# Patient Record
Sex: MALE | Race: WHITE
[De-identification: names, ages, dates, MRNs, and addresses within clinical notes are randomized per-mention and may not be internally consistent; named-entity substitution may affect disease eponyms.]

---

## 2018-06-19 ENCOUNTER — HOSPITAL ENCOUNTER (OUTPATIENT)
Dept: HOSPITAL 61 - SURGPAT | Age: 63
Discharge: HOME | End: 2018-06-19
Attending: ORTHOPAEDIC SURGERY
Payer: COMMERCIAL

## 2018-06-19 DIAGNOSIS — Z01.818: Primary | ICD-10-CM

## 2018-06-19 LAB
ADD MAN DIFF?: NO
ALBUMIN SERPL-MCNC: 3.9 G/DL (ref 3.4–5)
ANION GAP SERPL CALC-SCNC: 9 MMOL/L (ref 6–14)
BACTERIA,URINE: 0 /HPF
BASO #: 0.1 X10^3/UL (ref 0–0.2)
BASO %: 1 % (ref 0–3)
BILIRUBIN,URINE: NEGATIVE
BLOOD UREA NITROGEN: 13 MG/DL (ref 8–26)
CALCIUM: 9.6 MG/DL (ref 8.5–10.1)
CHLORIDE: 101 MMOL/L (ref 98–107)
CLARITY,URINE: CLEAR
CO2 SERPL-SCNC: 27 MMOL/L (ref 21–32)
COLOR,URINE: YELLOW
CREAT SERPL-MCNC: 0.9 MG/DL (ref 0.7–1.3)
EOS #: 0.3 X10^3/UL (ref 0–0.7)
EOS %: 2 % (ref 0–3)
GFR SERPLBLD BASED ON 1.73 SQ M-ARVRAT: 85 ML/MIN
GLUCOSE SERPL-MCNC: 90 MG/DL (ref 70–99)
GLUCOSE,URINE: NEGATIVE MG/DL
HCG SERPL-ACNC: 12.4 X10^3/UL (ref 4–11)
HEMATOCRIT: 45.6 % (ref 39–53)
HEMOGLOBIN: 15.6 G/DL (ref 13–17.5)
INR: 1 (ref 0.8–1.1)
LYMPH #: 1.8 X10^3/UL (ref 1–4.8)
LYMPH %: 15 % (ref 24–48)
MEAN CORPUSCULAR HEMOGLOBIN: 30 PG (ref 25–35)
MEAN CORPUSCULAR HGB CONC: 34 G/DL (ref 31–37)
MEAN CORPUSCULAR VOLUME: 89 FL (ref 79–100)
MONO #: 1.5 X10^3/UL (ref 0–1.1)
MONO %: 12 % (ref 0–9)
NEUT #: 8.7 X10^3UL (ref 1.8–7.7)
NEUT %: 70 % (ref 31–73)
NITRITE,URINE: NEGATIVE
PARTIAL THROMBOPLASTIN TIME: 33 SEC (ref 24–38)
PH,URINE: 6
PLATELET COUNT: 390 X10^3/UL (ref 140–400)
PLT ESTIMATE: ADEQUATE
POTASSIUM SERPL-SCNC: 3.5 MMOL/L (ref 3.5–5.1)
PROTEIN,URINE: NEGATIVE MG/DL
PROTHROMBIN TIME PATIENT: 13.1 SEC (ref 11.7–14)
RBC,URINE: 0 /HPF (ref 0–2)
RED BLOOD COUNT: 5.15 X10^6/UL (ref 4.3–5.7)
RED CELL DISTRIBUTION WIDTH: 13.4 % (ref 11.5–14.5)
SEDIMENTATION RATE: 44 (ref 0–15)
SODIUM: 137 MMOL/L (ref 136–145)
SPECIFIC GRAVITY,URINE: 1.01
SQUAMOUS EPITHELIAL CELL,UR: (no result) /LPF
UROBILINOGEN,URINE: 0.2 MG/DL
WBC,URINE: (no result) /HPF (ref 0–4)

## 2018-06-19 PROCEDURE — 85025 COMPLETE CBC W/AUTO DIFF WBC: CPT

## 2018-06-19 PROCEDURE — 85730 THROMBOPLASTIN TIME PARTIAL: CPT

## 2018-06-19 PROCEDURE — 87086 URINE CULTURE/COLONY COUNT: CPT

## 2018-06-19 PROCEDURE — 81001 URINALYSIS AUTO W/SCOPE: CPT

## 2018-06-19 PROCEDURE — 93005 ELECTROCARDIOGRAM TRACING: CPT

## 2018-06-19 PROCEDURE — 87641 MR-STAPH DNA AMP PROBE: CPT

## 2018-06-19 PROCEDURE — 80048 BASIC METABOLIC PNL TOTAL CA: CPT

## 2018-06-19 PROCEDURE — 82040 ASSAY OF SERUM ALBUMIN: CPT

## 2018-06-19 PROCEDURE — 36415 COLL VENOUS BLD VENIPUNCTURE: CPT

## 2018-06-19 PROCEDURE — 85610 PROTHROMBIN TIME: CPT

## 2018-06-19 PROCEDURE — 71046 X-RAY EXAM CHEST 2 VIEWS: CPT

## 2018-06-19 PROCEDURE — 85651 RBC SED RATE NONAUTOMATED: CPT

## 2018-06-26 ENCOUNTER — HOSPITAL ENCOUNTER (INPATIENT)
Dept: HOSPITAL 61 - OPSVCIP | Age: 63
LOS: 1 days | Discharge: HOME | DRG: 468 | End: 2018-06-27
Attending: ORTHOPAEDIC SURGERY | Admitting: ORTHOPAEDIC SURGERY
Payer: COMMERCIAL
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
## 2018-06-26 DIAGNOSIS — T84.061A: Primary | ICD-10-CM

## 2018-06-26 DIAGNOSIS — T84.021A: ICD-10-CM
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
## 2018-06-26 LAB
INR: 1 (ref 0.8–1.1)
PARTIAL THROMBOPLASTIN TIME: 31 SEC (ref 24–38)
PROTHROMBIN TIME PATIENT: 13 SEC (ref 11.7–14)

## 2018-06-26 PROCEDURE — 0SRS03Z REPLACEMENT OF LEFT HIP JOINT, FEMORAL SURFACE WITH CERAMIC SYNTHETIC SUBSTITUTE, OPEN APPROACH: ICD-10-PCS

## 2018-06-26 PROCEDURE — 97530 THERAPEUTIC ACTIVITIES: CPT

## 2018-06-26 PROCEDURE — 0SUE09Z SUPPLEMENT LEFT HIP JOINT, ACETABULAR SURFACE WITH LINER, OPEN APPROACH: ICD-10-PCS

## 2018-06-26 PROCEDURE — 86900 BLOOD TYPING SEROLOGIC ABO: CPT

## 2018-06-26 PROCEDURE — 86901 BLOOD TYPING SEROLOGIC RH(D): CPT

## 2018-06-26 PROCEDURE — 97165 OT EVAL LOW COMPLEX 30 MIN: CPT

## 2018-06-26 PROCEDURE — 85018 HEMOGLOBIN: CPT

## 2018-06-26 PROCEDURE — 85730 THROMBOPLASTIN TIME PARTIAL: CPT

## 2018-06-26 PROCEDURE — 97116 GAIT TRAINING THERAPY: CPT

## 2018-06-26 PROCEDURE — 36415 COLL VENOUS BLD VENIPUNCTURE: CPT

## 2018-06-26 PROCEDURE — 86850 RBC ANTIBODY SCREEN: CPT

## 2018-06-26 PROCEDURE — 0SPB09Z REMOVAL OF LINER FROM LEFT HIP JOINT, OPEN APPROACH: ICD-10-PCS

## 2018-06-26 PROCEDURE — 88112 CYTOPATH CELL ENHANCE TECH: CPT

## 2018-06-26 PROCEDURE — 0SPS0JZ REMOVAL OF SYNTHETIC SUBSTITUTE FROM LEFT HIP JOINT, FEMORAL SURFACE, OPEN APPROACH: ICD-10-PCS

## 2018-06-26 PROCEDURE — 85610 PROTHROMBIN TIME: CPT

## 2018-06-26 PROCEDURE — 87075 CULTR BACTERIA EXCEPT BLOOD: CPT

## 2018-06-26 PROCEDURE — 97150 GROUP THERAPEUTIC PROCEDURES: CPT

## 2018-06-26 PROCEDURE — 87071 CULTURE AEROBIC QUANT OTHER: CPT

## 2018-06-26 PROCEDURE — 85014 HEMATOCRIT: CPT

## 2018-06-26 RX ADMIN — MORPHINE SULFATE 1: 10 INJECTION INTRAMUSCULAR; INTRAVENOUS; SUBCUTANEOUS at 13:28

## 2018-06-26 RX ADMIN — SODIUM CHLORIDE, SODIUM LACTATE, POTASSIUM CHLORIDE, AND CALCIUM CHLORIDE 1 MLS/HR: .6; .31; .03; .02 INJECTION, SOLUTION INTRAVENOUS at 12:41

## 2018-06-26 RX ADMIN — CLINDAMYCIN PHOSPHATE 1 MLS/HR: 18 INJECTION, SOLUTION INTRAVENOUS at 13:02

## 2018-06-26 RX ADMIN — APIXABAN 1 MG: 2.5 TABLET, FILM COATED ORAL at 21:47

## 2018-06-26 RX ADMIN — Medication 1 MG: at 18:39

## 2018-06-26 RX ADMIN — TRANEXAMIC ACID 1 MLS/HR: 100 INJECTION, SOLUTION INTRAVENOUS at 13:08

## 2018-06-26 RX ADMIN — DEXTROSE AND SODIUM CHLORIDE 1 MLS/HR: 5; .45 INJECTION, SOLUTION INTRAVENOUS at 20:04

## 2018-06-26 RX ADMIN — CELECOXIB 1 MG: 100 CAPSULE ORAL at 12:46

## 2018-06-26 RX ADMIN — CLINDAMYCIN PHOSPHATE 1 MLS/HR: 18 INJECTION, SOLUTION INTRAVENOUS at 18:40

## 2018-06-26 RX ADMIN — SIMVASTATIN 1 MG: 40 TABLET, FILM COATED ORAL at 21:47

## 2018-06-26 RX ADMIN — DEXTROSE AND SODIUM CHLORIDE 1 MLS/HR: 5; .45 INJECTION, SOLUTION INTRAVENOUS at 16:06

## 2018-06-27 LAB
HEMATOCRIT: 42.2 % (ref 39–53)
HEMOGLOBIN: 14.4 G/DL (ref 13–17.5)
MEAN CORPUSCULAR HGB CONC: 34 G/DL (ref 31–37)

## 2018-06-27 RX ADMIN — CELECOXIB 1 MG: 100 CAPSULE ORAL at 08:25

## 2018-06-27 RX ADMIN — Medication 1 MG: at 08:25

## 2018-06-27 RX ADMIN — MULTIPLE VITAMINS W/ MINERALS TAB 1 TAB: TAB at 08:25

## 2018-06-27 RX ADMIN — APIXABAN 1 MG: 2.5 TABLET, FILM COATED ORAL at 08:25

## 2018-06-27 RX ADMIN — CLINDAMYCIN PHOSPHATE 1 MLS/HR: 18 INJECTION, SOLUTION INTRAVENOUS at 06:30

## 2018-06-27 RX ADMIN — SENNOSIDES AND DOCUSATE SODIUM 1 TAB: 8.6; 5 TABLET ORAL at 08:25

## 2018-06-27 RX ADMIN — LISINOPRIL 1 MG: 20 TABLET ORAL at 06:54

## 2018-06-27 RX ADMIN — CLINDAMYCIN PHOSPHATE 1 MLS/HR: 18 INJECTION, SOLUTION INTRAVENOUS at 00:35

## 2019-06-04 ENCOUNTER — HOSPITAL ENCOUNTER (EMERGENCY)
Dept: HOSPITAL 61 - ER | Age: 64
Discharge: HOME | End: 2019-06-04
Payer: COMMERCIAL

## 2019-06-04 VITALS — HEIGHT: 74 IN | WEIGHT: 200 LBS | BODY MASS INDEX: 25.67 KG/M2

## 2019-06-04 VITALS — SYSTOLIC BLOOD PRESSURE: 133 MMHG | DIASTOLIC BLOOD PRESSURE: 71 MMHG

## 2019-06-04 VITALS — DIASTOLIC BLOOD PRESSURE: 70 MMHG | SYSTOLIC BLOOD PRESSURE: 130 MMHG
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
## 2019-06-04 DIAGNOSIS — Z88.0: ICD-10-CM

## 2019-06-04 DIAGNOSIS — T84.021A: Primary | ICD-10-CM

## 2019-06-04 DIAGNOSIS — E78.00: ICD-10-CM

## 2019-06-04 DIAGNOSIS — Z96.642: ICD-10-CM
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
## 2019-06-04 DIAGNOSIS — I10: ICD-10-CM

## 2019-06-04 PROCEDURE — 27265 TREAT HIP DISLOCATION: CPT

## 2019-06-04 PROCEDURE — 99284 EMERGENCY DEPT VISIT MOD MDM: CPT

## 2019-06-04 PROCEDURE — 96374 THER/PROPH/DIAG INJ IV PUSH: CPT

## 2019-06-04 PROCEDURE — 99285 EMERGENCY DEPT VISIT HI MDM: CPT

## 2019-06-04 PROCEDURE — 73502 X-RAY EXAM HIP UNI 2-3 VIEWS: CPT

## 2019-06-04 NOTE — PHYS DOC
Past Medical History


Past Medical History:  High Cholesterol, Hypertension


Past Surgical History:  Hip Replacement


Alcohol Use:  None





Adult General


Chief Complaint


Chief Complaint:  HIP PAIN





HPI


HPI


Patient is a 63-year-old male who presents to the emergency department for 

evaluation. Patient states he was on a roof, cleaning gutters, when he twisted 

his left leg wrong, and felt his left hip dislocated. He has a history of a 

prior left total hip arthroplasty, as well as a revision to the surgery with 

hardware about 3 years. He did not improve, or suffer any other traumatic 

injury. He denies any numbness or weakness. He received 100 g By EMS. Movement 

and palpation of his left hip to worsen his pain. There are no alleviating 

factors to his symptoms otherwise.





Review of Systems


Review of Systems





Respiratory: Denies cough or shortness of breath []


GI: Denies abdominal pain, nausea, vomiting, bloody stools or diarrhea []


Musculoskeletal: Denies back pain or joint pain, except in the left hip []


Integument: Denies rash or skin lesions []


Neurologic: Denies headache, focal weakness or sensory changes []





Current Medications


Current Medications





Current Medications








 Medications


  (Trade)  Dose


 Ordered  Sig/Jovan  Start Time


 Stop Time Status Last Admin


Dose Admin


 


 Fentanyl Citrate


  (Fentanyl 2ml


 Vial)  50 mcg  1X  ONCE  6/4/19 10:45


 6/4/19 10:46 DC 6/4/19 10:40


50 MCG


 


 Midazolam HCl


  (Versed)  2 mg  1X  ONCE  6/4/19 11:45


 6/4/19 11:48 DC 6/4/19 11:45


2 MG


 


 Propofol


  (Diprivan)  100 mg  1X  ONCE  6/4/19 11:45


 6/4/19 11:48 DC 6/4/19 11:46


100 MG


 


 Sodium Chloride  1,000 ml @ 


 1,000 mls/hr  1X  ONCE  6/4/19 10:45


 6/4/19 11:44 DC 6/4/19 10:40


1,000 MLS/HR











Allergies


Allergies





Allergies








Coded Allergies Type Severity Reaction Last Updated Verified


 


  Penicillins Adverse Reaction Intermediate Palpitations 6/26/18 Yes











Physical Exam


Physical Exam


PHYSICAL EXAM:





CONSTITUTIONAL: Well developed, well nourished


HEAD: normocephalic, atraumatic


EENT: PERRL, EOMI. Conjunctivae normal color, sclerae non-icteric; moist mucous 

membranes.


NECK: Supple, non-tender; no meningismus.


LUNGS: Lungs CTA, breathing even and unlabored. Normal air movement.


HEART: Regular rate and rhythm, no murmur


CHEST: No deformity; non-tender


ABDOMEN: The abdomen is soft, and non-tender, no masses or bruits.


EXTREM: The patient is holding the left hip and knee flexed, and is laying on 

his right side. There is tenderness to palpation of his left hip area, with a 

well-healed incision present. Any movement of the left hip is painful to the 

patient. There is a strong distal dorsalis pedis pulse, as well as distal 

sensation and motor function on the left leg are intact. The remainder the 

extremities are unremarkable, with Normal ROM; no deformity, no calf tenderness.

 Normal pulses palpable in all extremities. There is no pedal edema.


SKIN: No rash; no diaphoresis


NEURO: Alert; normal speech and cognition; CN's grossly intact; strength grossly

 intact without focal deficit.


BACK: No CVA TTP.





Current Patient Data


Vital Signs





                                   Vital Signs








  Date Time  Temp Pulse Resp B/P (MAP) Pulse Ox O2 Delivery O2 Flow Rate FiO2


 


6/4/19 11:45  62 16 105/55 (72) 96 Room Air  


 


6/4/19 11:35       2.0 


 


6/4/19 11:10 97.4       





 96.6       





 96.6       











EKG


EKG


[]





Radiology/Procedures


Radiology/Procedures


[PROCEDURE: HIP LEFT 2 VIEW





2 view left hip study


 


Clinical indications: Left hip pain. Suspected dislocation.


 


FINDINGS: Total left hip arthroplasty is evident. There is superior 


lateral dislocation of the femoral head prosthesis out of the acetabular 


prosthesis. No acute fracture or lytic process evident.


 


IMPRESSION: Dislocation of left hip prosthesis.


 ]








PROCEDURE: HIP LEFT 2 VIEW





Examination: 2 views of the left hip


 


HISTORY: History of postreduction.


 


COMPARISON: 6/4/2019.


 


Findings/


impression:


 


Postreduction changes of the left hip prosthesis with the left hip total 


arthroplasty changes in normal alignment.





Course & Med Decision Making


Course & Med Decision Making


Pertinent  Imaging studies reviewed. (See chart for details)





[]





PROCEDURE NOTE: Closed hip reduction with moderate sedation:





 informed consent was obtained from the patient, and appropriate monitoring time

 and she refused. The patient was given initially 1 mg of Versed and 70 mg of 

propofol, but the patient did have some persistent muscle spasming, which made 

reduction on the initial attempt difficult. He was subsequently given an 

additional 30 mg of propofol, and another 1 mg of Versed, with appropriate 

relaxation, which facilitated successful reduction of the patient's hip 

dislocation clinically. An x-ray will be obtained. PMS intact post reduction. 

The patient did experience some airway obstruction by his tongue, requiring 

brief your thrust, but did not have hypoxia below 88%. End-tidal CO2 was 

monitored, and did not rise. The patient has recovered successfully, and will 

continue to be monitored in the emergency department. 








11:25 AM: I have  paged the patient's orthopedist, to discuss the case with the 

patient's physician.








12:40 PM: The patient's condition remained stable. He has recovered from his 

sedation unremarkably. He is able to ambulate without significant difficulty, 

and only mild residual soreness. Discussed with Dr. Valles,, on-call for the 

patient's orthopedist, who agreed with management of the patient. I discussed 

limiting patient the patient, the need for orthopedic follow-up and return 

precautions.





Dragon Disclaimer


Dragon Disclaimer


This electronic medical record was generated, in whole or in part, using a voice

 recognition dictation system.





Departure


Departure


Impression:  


   Primary Impression:  


   Hip dislocation, left


Disposition:  01 HOME, SELF-CARE


Condition:  STABLE


Referrals:  


THOR LANDERS Jr, MD (PCP)








DAE LEDESMA MD


Patient Instructions:  Hip Dislocation











ROLANDO ETIENNE MD            Jun 4, 2019 10:39

## 2019-06-04 NOTE — RAD
2 view left hip study

 

Clinical indications: Left hip pain. Suspected dislocation.

 

FINDINGS: Total left hip arthroplasty is evident. There is superior 

lateral dislocation of the femoral head prosthesis out of the acetabular 

prosthesis. No acute fracture or lytic process evident.

 

IMPRESSION: Dislocation of left hip prosthesis.

 

Electronically signed by: Adalid London MD (6/4/2019 11:01 AM) [id number]

## 2019-06-04 NOTE — RAD
Examination: 2 views of the left hip

 

HISTORY: History of postreduction.

 

COMPARISON: 6/4/2019.

 

Findings/

impression:

 

Postreduction changes of the left hip prosthesis with the left hip total 

arthroplasty changes in normal alignment.

 

Electronically signed by: Gavin Henson MD (6/4/2019 11:57 AM) [place] [id number]

## 2020-03-31 ENCOUNTER — HOSPITAL ENCOUNTER (OUTPATIENT)
Dept: HOSPITAL 61 - ER | Age: 65
Discharge: HOME | End: 2020-03-31
Attending: ORTHOPAEDIC SURGERY
Payer: MEDICARE

## 2020-03-31 VITALS — SYSTOLIC BLOOD PRESSURE: 125 MMHG | DIASTOLIC BLOOD PRESSURE: 63 MMHG

## 2020-03-31 VITALS — SYSTOLIC BLOOD PRESSURE: 106 MMHG | DIASTOLIC BLOOD PRESSURE: 29 MMHG

## 2020-03-31 VITALS — BODY MASS INDEX: 32.14 KG/M2 | WEIGHT: 250.45 LBS | HEIGHT: 74 IN
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
## 2020-03-31 DIAGNOSIS — I10: ICD-10-CM

## 2020-03-31 DIAGNOSIS — Z96.642: ICD-10-CM
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
## 2020-03-31 DIAGNOSIS — F17.210: ICD-10-CM

## 2020-03-31 DIAGNOSIS — S73.005A: Primary | ICD-10-CM
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
## 2020-03-31 DIAGNOSIS — E78.00: ICD-10-CM
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
## 2020-03-31 DIAGNOSIS — Z88.0: ICD-10-CM

## 2020-03-31 LAB
ALBUMIN SERPL-MCNC: 3.7 G/DL (ref 3.4–5)
ALBUMIN/GLOB SERPL: 1 {RATIO} (ref 1–1.7)
ALP SERPL-CCNC: 46 U/L (ref 46–116)
ALT SERPL-CCNC: 26 U/L (ref 16–63)
ANION GAP SERPL CALC-SCNC: 13 MMOL/L (ref 6–14)
AST SERPL-CCNC: 20 U/L (ref 15–37)
BASOPHILS # BLD AUTO: 0.1 X10^3/UL (ref 0–0.2)
BASOPHILS NFR BLD: 1 % (ref 0–3)
BILIRUB SERPL-MCNC: 0.4 MG/DL (ref 0.2–1)
BUN SERPL-MCNC: 14 MG/DL (ref 8–26)
BUN/CREAT SERPL: 12 (ref 6–20)
CALCIUM SERPL-MCNC: 9.3 MG/DL (ref 8.5–10.1)
CHLORIDE SERPL-SCNC: 101 MMOL/L (ref 98–107)
CO2 SERPL-SCNC: 23 MMOL/L (ref 21–32)
CREAT SERPL-MCNC: 1.2 MG/DL (ref 0.7–1.3)
EOSINOPHIL NFR BLD: 0.2 X10^3/UL (ref 0–0.7)
EOSINOPHIL NFR BLD: 2 % (ref 0–3)
ERYTHROCYTE [DISTWIDTH] IN BLOOD BY AUTOMATED COUNT: 13.5 % (ref 11.5–14.5)
GFR SERPLBLD BASED ON 1.73 SQ M-ARVRAT: 60.6 ML/MIN
GLOBULIN SER-MCNC: 3.8 G/DL (ref 2.2–3.8)
GLUCOSE SERPL-MCNC: 117 MG/DL (ref 70–99)
HCT VFR BLD CALC: 45.1 % (ref 39–53)
HGB BLD-MCNC: 15 G/DL (ref 13–17.5)
LYMPHOCYTES # BLD: 2.1 X10^3/UL (ref 1–4.8)
LYMPHOCYTES NFR BLD AUTO: 21 % (ref 24–48)
MCH RBC QN AUTO: 29 PG (ref 25–35)
MCHC RBC AUTO-ENTMCNC: 33 G/DL (ref 31–37)
MCV RBC AUTO: 88 FL (ref 79–100)
MONO #: 1.2 X10^3/UL (ref 0–1.1)
MONOCYTES NFR BLD: 12 % (ref 0–9)
NEUT #: 6 X10^3/UL (ref 1.8–7.7)
NEUTROPHILS NFR BLD AUTO: 63 % (ref 31–73)
PLATELET # BLD AUTO: 262 X10^3/UL (ref 140–400)
POTASSIUM SERPL-SCNC: 3.6 MMOL/L (ref 3.5–5.1)
PROT SERPL-MCNC: 7.5 G/DL (ref 6.4–8.2)
PROTHROMBIN TIME: 12.9 SEC (ref 11.7–14)
RBC # BLD AUTO: 5.12 X10^6/UL (ref 4.3–5.7)
SODIUM SERPL-SCNC: 137 MMOL/L (ref 136–145)
WBC # BLD AUTO: 9.6 X10^3/UL (ref 4–11)

## 2020-03-31 PROCEDURE — 73502 X-RAY EXAM HIP UNI 2-3 VIEWS: CPT

## 2020-03-31 PROCEDURE — 80053 COMPREHEN METABOLIC PANEL: CPT

## 2020-03-31 PROCEDURE — 85610 PROTHROMBIN TIME: CPT

## 2020-03-31 PROCEDURE — 85025 COMPLETE CBC W/AUTO DIFF WBC: CPT

## 2020-03-31 PROCEDURE — 73501 X-RAY EXAM HIP UNI 1 VIEW: CPT

## 2020-03-31 PROCEDURE — 36415 COLL VENOUS BLD VENIPUNCTURE: CPT

## 2020-03-31 PROCEDURE — 27266 TREAT HIP DISLOCATION: CPT

## 2020-03-31 NOTE — PDOC4
Operative Note


Operative Note


Date of surgery: 3/31/2020





Preoperative diagnosis: Dislocated left total hip arthroplasty





Postoperative diagnosis: Same





Operative procedure: Closed reduction left total hip arthroplasty dislocation





Surgeon: Tyrone





Anesthesia: Deep sedation provided by anesthesia





Complications: None





Estimated blood loss: None





Operative indications: Please see my dictated orthopedic emergency Department 

consultation of today for detail operative indications





Operative text: Patient was identified procedure verified patient placed in the 

supine position on the recovery room cart and conscious sedation was initially 

provided by anesthesia with propofol and due to the difficulty of the reduction 

added some muscle relaxant as well and after sufficient traction countertraction

was obtained the left hip was relocated and verified with a adequate reduction 

with an AP view of the pelvis and the hip components were noted to remain well 

fixed and aligned.  He was placed in a knee immobilizer and was discharged after

satisfactory recovery and ability to ambulate on his own and was given specific 

instructions on his hip precautions use of the knee immobilizer











DAE LEDESMA MD           Mar 31, 2020 17:35
Render Post-Care Instructions In Note?: no
Detail Level: Simple
Consent: The patient's consent was obtained including but not limited to risks of crusting, scabbing, blistering, scarring, darker or lighter pigmentary change, recurrence, incomplete removal and infection.
Post-Care Instructions: I reviewed with the patient in detail post-care instructions. Patient is to wear sunprotection, and avoid picking at any of the treated lesions. Pt may apply Vaseline to crusted or scabbing areas.
Number Of Freeze-Thaw Cycles: 2 freeze-thaw cycles
Duration Of Freeze Thaw-Cycle (Seconds): 0

## 2020-03-31 NOTE — RAD
Left hip single view

 

COMPARISON: Earlier same day left hip x-rays

 

FINDINGS:

AP portable view of the left hip shows interval reduction of the left hip 

dislocation status post total hip arthroplasty. No fracture or aggressive 

osseous lesions.

 

IMPRESSION:

Single view of the left hip suggests interval successful reduction of the 

left hip dislocation evident previously.

 

Electronically signed by: Chaitanya Moe MD (3/31/2020 4:08 PM) 

[id number]

## 2020-03-31 NOTE — PHYS DOC
Past Medical History


Past Medical History:  High Cholesterol, Hypertension


Past Surgical History:  Hip Replacement


Additional Past Surgical Histo:  LEFT HIP REPLACEMENT


Smoking Status:  Current Every Day Smoker


Alcohol Use:  None


Drug Use:  None





Adult General


Chief Complaint


Chief Complaint:  HIP PAIN





HPI


HPI





Patient is a 64  year old male presenting to the ED with a chief complaint of 

left hip dislocation.  Patient states that he was out in his yard when he felt 

his left hip dislocated.  Patient states that he has a hip replacement in his 

left hip.  His orthopedic surgeon is Dr. Hansen.  Patient states that this is 

happened in the past.  Patient denies any other injury.  Patient does state that

he is an active smoker.





Review of Systems


Review of Systems





Patient complains of pain to his left hip.  Patient denies fever, chills, 

nausea, vomiting, diarrhea, dysuria, chest pain, abdominal pain, shortness of 

breath.





All other systems were reviewed and found to be within normal limits, except as 

documented in this note.





Current Medications


Current Medications





Current Medications








 Medications


  (Trade)  Dose


 Ordered  Sig/Jovan  Start Time


 Stop Time Status Last Admin


Dose Admin


 


 Fentanyl Citrate


  (Fentanyl 2ml


 Vial)  50 mcg  PRN Q5MIN  PRN  3/31/20 14:45


 4/1/20 14:44   





 


 Hydromorphone HCl


  (Dilaudid)  0.5 mg  PRN Q10MIN  PRN  3/31/20 14:45


 4/1/20 14:44   





 


 Ketorolac


 Tromethamine


  (Toradol 30mg


 Vial)  30 mg  STK-MED ONCE  3/31/20 15:14


 3/31/20 15:15 DC  





 


 Lorazepam


  (Ativan Inj)  1 mg  1X  ONCE  3/31/20 13:15


 3/31/20 13:17 DC 3/31/20 13:28


1 MG


 


 Morphine Sulfate


  (Morphine


 Sulfate)  1 mg  PRN Q10MIN  PRN  3/31/20 14:45


 4/1/20 14:44   





 


 Ondansetron HCl


  (Zofran)  4 mg  PRN Q6HRS  PRN  3/31/20 14:45


 4/1/20 14:44   





 


 Prochlorperazine


 Edisylate


  (Compazine)  5 mg  PACU PRN  PRN  3/31/20 14:45


 4/1/20 14:44   





 


 Propofol  20 ml @ As


 Directed  STK-MED ONCE  3/31/20 14:57


 3/31/20 14:58 DC  





 


 Ringer's Solution  1,000 ml @ 


 30 mls/hr  Q24H  3/31/20 14:37


 4/1/20 02:36 DC  














Allergies


Allergies





Allergies








Coded Allergies Type Severity Reaction Last Updated Verified


 


  Penicillins Adverse Reaction Intermediate Palpitations 6/26/18 Yes











Physical Exam


Physical Exam





Constitutional: Well developed, well nourished, in mild distress


HENT: Normocephalic, atraumatic


Eyes: PERRLA, EOMI


Neck: Normal range of motion


Cardiovascular:Heart rate regular rhythm, no murmur []


Lungs & Thorax: Lungs clear to auscultation bilaterally


Extremities: Obvious deformity to the left hip.  Neurovascular intact distally.


Neurologic: Alert and oriented X 3





Current Patient Data


Vital Signs





                                   Vital Signs








  Date Time  Temp Pulse Resp B/P (MAP) Pulse Ox O2 Delivery O2 Flow Rate FiO2


 


3/31/20 15:10  72 20 167/56 97 Nasal Cannula 4.0 


 


3/31/20 15:10 97.4       





 97.4       








Lab Values





                                Laboratory Tests








Test


 3/31/20


13:10


 


White Blood Count


 9.6 x10^3/uL


(4.0-11.0)


 


Red Blood Count


 5.12 x10^6/uL


(4.30-5.70)


 


Hemoglobin


 15.0 g/dL


(13.0-17.5)


 


Hematocrit


 45.1 %


(39.0-53.0)


 


Mean Corpuscular Volume


 88 fL ()





 


Mean Corpuscular Hemoglobin 29 pg (25-35)  


 


Mean Corpuscular Hemoglobin


Concent 33 g/dL


(31-37)


 


Red Cell Distribution Width


 13.5 %


(11.5-14.5)


 


Platelet Count


 262 x10^3/uL


(140-400)


 


Neutrophils (%) (Auto) 63 % (31-73)  


 


Lymphocytes (%) (Auto) 21 % (24-48)  L


 


Monocytes (%) (Auto) 12 % (0-9)  H


 


Eosinophils (%) (Auto) 2 % (0-3)  


 


Basophils (%) (Auto) 1 % (0-3)  


 


Neutrophils # (Auto)


 6.0 x10^3/uL


(1.8-7.7)


 


Lymphocytes # (Auto)


 2.1 x10^3/uL


(1.0-4.8)


 


Monocytes # (Auto)


 1.2 x10^3/uL


(0.0-1.1)  H


 


Eosinophils # (Auto)


 0.2 x10^3/uL


(0.0-0.7)


 


Basophils # (Auto)


 0.1 x10^3/uL


(0.0-0.2)


 


Prothrombin Time


 12.9 SEC


(11.7-14.0)


 


Prothrombin Time INR 1.0 (0.8-1.1)  


 


Sodium Level


 137 mmol/L


(136-145)


 


Potassium Level


 3.6 mmol/L


(3.5-5.1)


 


Chloride Level


 101 mmol/L


()


 


Carbon Dioxide Level


 23 mmol/L


(21-32)


 


Anion Gap 13 (6-14)  


 


Blood Urea Nitrogen


 14 mg/dL


(8-26)


 


Creatinine


 1.2 mg/dL


(0.7-1.3)


 


Estimated GFR


(Cockcroft-Gault) 60.6  





 


BUN/Creatinine Ratio 12 (6-20)  


 


Glucose Level


 117 mg/dL


(70-99)  H


 


Calcium Level


 9.3 mg/dL


(8.5-10.1)


 


Total Bilirubin


 0.4 mg/dL


(0.2-1.0)


 


Aspartate Amino Transferase


(AST) 20 U/L (15-37)





 


Alanine Aminotransferase (ALT)


 26 U/L (16-63)





 


Alkaline Phosphatase


 46 U/L


()


 


Total Protein


 7.5 g/dL


(6.4-8.2)


 


Albumin


 3.7 g/dL


(3.4-5.0)


 


Albumin/Globulin Ratio 1.0 (1.0-1.7)  





                                Laboratory Tests


3/31/20 13:10








                                Laboratory Tests


3/31/20 13:10











EKG


EKG


[]





Radiology/Procedures


Radiology/Procedures


[]





Course & Med Decision Making


Course & Med Decision Making


Pertinent Labs and Imaging studies reviewed. (See chart for details)





Ordered x-ray of the left hip.


Ordered pain medication, nausea medicine and Ativan 1 mg IV.





X-ray of the left hip shows a superior dislocation.





Patient was given 50mg propofol.


Unable to reduce the dislocation.





Discussed case with Dr. Cardozo who says that he will take the patient to the OR

 for closed reduction.





Discussed results and plan of care with patient.





Dragon Disclaimer


Dragon Disclaimer


This electronic medical record was generated, in whole or in part, using a voice

 recognition dictation system.





Departure


Departure


Impression:  


   Primary Impression:  


   Hip dislocation, left


Disposition:  01 HOME, SELF-CARE


Condition:  IMPROVED


Referrals:  


THOR LANDERS Jr, MD (PCP)








DAE HANSEN MD


in 1-2 days


Patient Instructions:  Closed Reduction for Artificial Hip Dislocation, Hip 

Dislocation











AGUSTINA KAPADIA DO           Mar 31, 2020 13:17

## 2020-03-31 NOTE — DISCH
DISCHARGE INSTRUCTIONS


Condition on Discharge


Condition on Discharge:  Stable





Activity After Discharge


Activity Instructions for Disc:  Activity as tolerated, Other, see below (use 

knee immobilizer for transfers sitting to standing or getting in and out of bed 

until follow-up in 3 weeks)


Bathing Instructions:  Shower-keep dressing dry


Lifting Instructions after Dis:  No heavy lifting, No pulling or pushing, Add. 

restrict see below (avoid bending at the waist beyond 90)


Exercise Instruction after Dis:  Progress as tolerated


Driving Instructions after Dis:  Do not drive


Weight Bearing Status after Di:  As tolerated (use knee immobilizer for walking 

in the first week)





Diet after Discharge


Diet after Discharge:  Regular





Wound Incision Care


Wound/Incision Care:  Ice to area for comfort





Contacting the DRMargarette after DC


Call your doctor for:  Concerns you may have





Follow-Up


Follow up with:  Dr. Hansen 3 weeks











DAE HANSEN MD           Mar 31, 2020 16:31

## 2020-03-31 NOTE — NUR
1515-DR LEDESMA AT BEDSIDE. PROCEDURE START AT 1517. DR LEDESMA RELOCATED PT'S HIP 
DISLOCATION UNDER MAC ANESTHESIA. PROCEDURE ENDED AT 1531.

## 2020-03-31 NOTE — RAD
2 view study of the left hip

 

Clinical indications: Dislocation.

 

FINDINGS: There is superior lateral dislocation of the left femoral head 

prosthesis out of the acetabular prosthesis. No acute fracture is seen. No

lytic process is evident.

 

IMPRESSION: Dislocation of left hip prosthesis.

 

Electronically signed by: Adalid London MD (3/31/2020 1:31 PM) [id number]

## 2020-03-31 NOTE — CONS
DATE OF CONSULTATION:  03/31/2020



EMERGENCY DEPARTMENT CONSULTATION



REQUESTING PHYSICIAN:  Zoë Mcnally DO.



REASON FOR CONSULTATION:  Left hip dislocation.



HISTORY:  The patient is a 64-year-old male known to me from previous treatment

of left hip instability.  He had remotely had a left total hip arthroplasty done

by Dr. Virgen and has undergone a few different closed reduction procedures in

the past.  He was out in his yard today and felt his left hip dislocate as he

bent over.  He denied any fall or loss of consciousness or other injury but had

the sudden onset of pain, inability to ambulate and was evaluated in the

Emergency Department and failed closed reduction under conscious sedation.



PAST MEDICAL HISTORY:  Significant for hypertension and hypercholesterolemia.



PAST SURGICAL HISTORY:  Left total hip arthroplasty.



SOCIAL HISTORY:  Denies alcohol or drug use.  He does smoke every day, however.



FAMILY HISTORY:  Noncontributory.



ALLERGIES:  HE HAS AN ALLERGY TO PENICILLINS WHICH WAS APPARENTLY PALPITATIONS.



MEDICATION LIST:  Reviewed.



REVIEW OF SYSTEMS:  Denies any chest pain, shortness of breath, recent fever,

chills, nausea, vomiting or other constitutional symptoms.



PHYSICAL EXAMINATION:

VITAL SIGNS:  Temperature 97.4, pulse 70, respirations 13-16 range, blood

pressure 125/63, 98% saturation on room air.

HEENT:  Atraumatic, normocephalic.

HEART:  Regular rate and rhythm.

LUNGS:  Clear to auscultation bilaterally.

ABDOMEN:  Benign.

EXTREMITIES:  Examination of the left hip reveals shortened internally rotated

left hip, painful to any motion, and he has significant muscle spasm around the

hip girdle area.  He has a well-healed incision from previous total hip

arthroplasty on the left side.  Normal examination of bilateral knees and ankles

and of the contralateral right hip.



IMAGING:  X-rays show superior lateral dislocation of the left hip with

otherwise well-fixed components.



IMPRESSION:  Left total hip dislocation.



TREATMENT PLAN:  I had gone over with him the treatment plan of a planned closed

reduction.  He is going to be brought in to the recovery room to have anesthesia

administered.  Conscious sedation is required since he failed attempted closed

reduction under conscious sedation in the Emergency Department.  We covered the

unlikely possibility of open reduction, possibility of continued dislocations as

he had had this issue in the past.  We had talked about the undesirability of

operative treatment, particularly under this scenario of the COVID pandemic with

him and with the anesthesia providers the rationale for sedation.  He agrees to

proceed having given informed consent and will proceed urgently with a planned

discharge home after his procedure.

 



______________________________

DAE LEDESMA MD



DR:  MARTINA/paul  JOB#:  [id number] / [id number]

DD:  03/31/2020 17:31  DT:  03/31/2020 20:10

## 2020-06-16 ENCOUNTER — HOSPITAL ENCOUNTER (EMERGENCY)
Dept: HOSPITAL 61 - ER | Age: 65
Discharge: HOME | End: 2020-06-16
Payer: MEDICARE

## 2020-06-16 VITALS
DIASTOLIC BLOOD PRESSURE: 61 MMHG | SYSTOLIC BLOOD PRESSURE: 114 MMHG | DIASTOLIC BLOOD PRESSURE: 61 MMHG | SYSTOLIC BLOOD PRESSURE: 114 MMHG | SYSTOLIC BLOOD PRESSURE: 114 MMHG | DIASTOLIC BLOOD PRESSURE: 61 MMHG

## 2020-06-16 VITALS — WEIGHT: 265.44 LBS | BODY MASS INDEX: 34.07 KG/M2 | HEIGHT: 74 IN

## 2020-06-16 VITALS — DIASTOLIC BLOOD PRESSURE: 73 MMHG | SYSTOLIC BLOOD PRESSURE: 134 MMHG

## 2020-06-16 DIAGNOSIS — T84.021A: Primary | ICD-10-CM

## 2020-06-16 DIAGNOSIS — F10.20: ICD-10-CM
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
## 2020-06-16 DIAGNOSIS — I10: ICD-10-CM

## 2020-06-16 DIAGNOSIS — Z88.0: ICD-10-CM

## 2020-06-16 DIAGNOSIS — F17.200: ICD-10-CM
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
## 2020-06-16 PROCEDURE — 73501 X-RAY EXAM HIP UNI 1 VIEW: CPT

## 2020-06-16 PROCEDURE — 96375 TX/PRO/DX INJ NEW DRUG ADDON: CPT

## 2020-06-16 PROCEDURE — 96374 THER/PROPH/DIAG INJ IV PUSH: CPT

## 2020-06-16 PROCEDURE — 99285 EMERGENCY DEPT VISIT HI MDM: CPT

## 2020-06-16 PROCEDURE — 27250 TREAT HIP DISLOCATION: CPT

## 2020-06-16 PROCEDURE — 99152 MOD SED SAME PHYS/QHP 5/>YRS: CPT

## 2020-06-16 NOTE — RAD
Left hip single view

 

INDICATION: Concern for dislocation. Deformity.

 

COMPARISON: Left hip x-rays of 3/31/2020 and 3/31/2020

 

FINDINGS:

Left total hip arthroplasty is present with dislocation of the left 

femoral prosthesis from the acetabulum and superior override of the 

femoral prosthesis relative to the acetabulum.

 

No fracture is identified. No evidence of hardware loosening of 

buttressing is seen. Soft tissues are overall unremarkable.

 

IMPRESSION:

Dislocated left hip arthroplasty.

 

 

**********FOR INTERNAL CODING PURPOSES**********

 

Critical result:

 

Findings discussed with  ASHLEY WRIGHT at 6/16/2020 3:17 PM.

 

RESULT CODE: (C)  

 

 

 

 

 

 

 

Electronically signed by: Chaitanya Moe MD (6/16/2020 3:17 PM) 

[id number]

## 2020-06-16 NOTE — RAD
PROCEDURE: HIP LEFT 1 VIEW WITH PELVIS

 

STUDY DATE: 6/16/2020

 

CLINICAL INDICATION / HISTORY: Reason: s/p reduction of hip dislocation / 

Spl. Instructions:  / History: .

 

TECHNIQUE:AP pelvis and AP and crosstable lateral views of the left hip 

were obtained

 

COMPARISON: Left hip x-rays earlier the same day

 

FINDINGS:

 

Interval reduction of the dislocated prosthetic left hip in satisfactory 

position. No fracture.

 

Pelvic ring appears intact with right femoral neck incompletely evaluated 

due to external rotation..

 

IMPRESSION: 

 

Satisfactory reduction of the left hip dislocation status post prior left 

total hip arthroplasty. No fracture.

 

Electronically signed by: Chaitanya Moe MD (6/16/2020 5:58 PM) 

[id number]

## 2020-06-16 NOTE — PHYS DOC
Past Medical History


Past Medical History:  Hypertension


Past Surgical History:  Hip Replacement


Additional Past Surgical Histo:  LEFT HIP REPLACEMENT


Smoking Status:  Current Every Day Smoker


Alcohol Use:  Heavy


Drug Use:  None





General Adult


EDM:


Chief Complaint:  HIP PAIN





HPI:


HPI:





Patient is a 64  year old [f__sex] who presents with []





Review of Systems:


Review of Systems:


Constitutional:  Denies fever or chills. []


Eyes:  Denies change in visual acuity. []


HENT:  Denies nasal congestion or sore throat. [] 


Respiratory:  Denies cough or shortness of breath. [] 


Cardiovascular:  Denies chest pain or edema. [] 


GI:  Denies abdominal pain, nausea, vomiting, bloody stools or diarrhea. [] 


:  Denies dysuria. [] 


Musculoskeletal:  Denies back pain or joint pain. [] 


Integument:  Denies rash. [] 


Neurologic:  Denies headache, focal weakness or sensory changes. [] 


Endocrine:  Denies polyuria or polydipsia. [] 


Lymphatic:  Denies swollen glands. [] 


Psychiatric:  Denies depression or anxiety. []





Heart Score:


Risk Factors:


Risk Factors:  DM, Current or recent (<one month) smoker, HTN, HLP, family 

history of CAD, obesity.


Risk Scores:


Score 0 - 3:  2.5% MACE over next 6 weeks - Discharge Home


Score 4 - 6:  20.3% MACE over next 6 weeks - Admit for Clinical Observation


Score 7 - 10:  72.7% MACE over next 6 weeks - Early Invasive Strategies





Current Medications:





Current Medications








 Medications


  (Trade)  Dose


 Ordered  Sig/Jovan  Start Time


 Stop Time Status Last Admin


Dose Admin


 


 Fentanyl Citrate


  (Fentanyl 2ml


 Vial)  100 mcg  1X  ONCE  6/16/20 15:15


 6/16/20 15:16 DC 6/16/20 16:53


100 MCG


 


 Hydromorphone HCl


  (Dilaudid)  2 mg  STK-MED ONCE  6/16/20 15:05


 6/16/20 15:05 DC  





 


 Midazolam HCl


  (Versed)  5 mg  1X  ONCE  6/16/20 17:30


 6/16/20 17:34 DC 6/16/20 17:06


5 MG


 


 Propofol


  (Diprivan)  50 mg  1X  ONCE  6/16/20 17:30


 6/16/20 17:34 DC  





 


 Sodium Chloride  1,000 ml @ 


 1,000 mls/hr  1X  ONCE  6/16/20 15:15


 6/16/20 16:14 DC 6/16/20 16:54


1,000 MLS/HR











Allergies:


Allergies:





Allergies








Coded Allergies Type Severity Reaction Last Updated Verified


 


  Penicillins Adverse Reaction Intermediate Palpitations 6/26/18 Yes











Physical Exam:


PE:





Constitutional: Well developed, well nourished, no acute distress, non-toxic 

appearance. []


HENT: Normocephalic, atraumatic, bilateral external ears normal, oropharynx 

moist, no oral exudates, nose normal. []


Eyes: PERRLA, EOMI, conjunctiva normal, no discharge. [] 


Neck: Normal range of motion, no tenderness, supple, no stridor. [] 


Cardiovascular:Heart rate regular rhythm, no murmur []


Lungs & Thorax:  Bilateral breath sounds clear to auscultation []


Abdomen: Bowel sounds normal, soft, no tenderness, no masses, no pulsatile 

masses. [] 


Skin: Warm, dry, no erythema, no rash. [] 


Back: No tenderness, no CVA tenderness. [] 


Extremities: No tenderness, no cyanosis, no clubbing, ROM intact, no edema. [] 


Neurologic: Alert and oriented X 3, normal motor function, normal sensory 

function, no focal deficits noted. []


Psychologic: Affect normal, judgement normal, mood normal. []





Current Patient Data:


Vital Signs:





                                   Vital Signs








  Date Time  Temp Pulse Resp B/P (MAP) Pulse Ox O2 Delivery O2 Flow Rate FiO2


 


6/16/20 17:29  68  118/62 (80) 98 Nasal Cannula 2.0 


 


6/16/20 17:19 97.9  16     





   18     











EKG:


EKG:


[]





Radiology/Procedures:


Radiology/Procedures:


[]





Course & Med Decision Making:


Course & Med Decision Making


Pertinent Labs and Imaging studies reviewed. (See chart for details)





[]





Dragon Disclaimer:


Dragon Disclaimer:


This electronic medical record was generated, in whole or in part, using a voice

 recognition dictation system.





Departure


Departure


Impression:  


   Primary Impression:  


   Dislocation of hip joint prosthesis


   Qualified Codes:  T84.029A - Dislocation of unspecified internal joint 

   prosthesis, initial encounter; Z96.649 - Presence of unspecified artificial 

   hip joint


Disposition:  01 HOME, SELF-CARE


Condition:  IMPROVED


Referrals:  


LILLIG,SHAWN P MD (PCP)








DAE LEDESMA MD


Patient Instructions:  Closed Reduction for Artificial Hip Dislocation, Care 

After, Sedation, Moderate, Adult





Additional Instructions:  


Maintain knee immobilizer at all times until cleared by your orthopedic surgeon











ASHLEY WRIGHT DO             Jun 16, 2020 18:11

## 2020-07-14 ENCOUNTER — HOSPITAL ENCOUNTER (OUTPATIENT)
Dept: HOSPITAL 61 - SURGPAT | Age: 65
Discharge: HOME | End: 2020-07-14
Attending: ORTHOPAEDIC SURGERY
Payer: MEDICARE

## 2020-07-14 DIAGNOSIS — M25.352: ICD-10-CM

## 2020-07-14 DIAGNOSIS — M25.78: ICD-10-CM

## 2020-07-14 DIAGNOSIS — M47.814: ICD-10-CM

## 2020-07-14 DIAGNOSIS — Z01.818: Primary | ICD-10-CM

## 2020-07-14 LAB
ALBUMIN SERPL-MCNC: 3.8 G/DL (ref 3.4–5)
ANION GAP SERPL CALC-SCNC: 11 MMOL/L (ref 6–14)
APTT BLD: 31 SEC (ref 24–38)
BASOPHILS # BLD AUTO: 0.1 X10^3/UL (ref 0–0.2)
BASOPHILS NFR BLD: 1 % (ref 0–3)
BUN SERPL-MCNC: 12 MG/DL (ref 8–26)
CALCIUM SERPL-MCNC: 9.4 MG/DL (ref 8.5–10.1)
CHLORIDE SERPL-SCNC: 102 MMOL/L (ref 98–107)
CO2 SERPL-SCNC: 23 MMOL/L (ref 21–32)
CREAT SERPL-MCNC: 0.9 MG/DL (ref 0.7–1.3)
CRP SERPL-MCNC: 1.5 MG/L (ref 0–3.3)
EOSINOPHIL NFR BLD: 0.3 X10^3/UL (ref 0–0.7)
EOSINOPHIL NFR BLD: 4 % (ref 0–3)
ERYTHROCYTE [DISTWIDTH] IN BLOOD BY AUTOMATED COUNT: 13.4 % (ref 11.5–14.5)
GFR SERPLBLD BASED ON 1.73 SQ M-ARVRAT: 84.4 ML/MIN
GLUCOSE SERPL-MCNC: 93 MG/DL (ref 70–99)
HCT VFR BLD CALC: 44.4 % (ref 39–53)
HGB BLD-MCNC: 15.6 G/DL (ref 13–17.5)
LYMPHOCYTES # BLD: 1.8 X10^3/UL (ref 1–4.8)
LYMPHOCYTES NFR BLD AUTO: 20 % (ref 24–48)
MCH RBC QN AUTO: 31 PG (ref 25–35)
MCHC RBC AUTO-ENTMCNC: 35 G/DL (ref 31–37)
MCV RBC AUTO: 87 FL (ref 79–100)
MONO #: 1.1 X10^3/UL (ref 0–1.1)
MONOCYTES NFR BLD: 12 % (ref 0–9)
NEUT #: 5.7 X10^3/UL (ref 1.8–7.7)
NEUTROPHILS NFR BLD AUTO: 64 % (ref 31–73)
PLATELET # BLD AUTO: 234 X10^3/UL (ref 140–400)
POTASSIUM SERPL-SCNC: 3.7 MMOL/L (ref 3.5–5.1)
PROTHROMBIN TIME: 12.9 SEC (ref 11.7–14)
RBC # BLD AUTO: 5.12 X10^6/UL (ref 4.3–5.7)
SODIUM SERPL-SCNC: 136 MMOL/L (ref 136–145)
WBC # BLD AUTO: 8.9 X10^3/UL (ref 4–11)

## 2020-07-14 PROCEDURE — 86140 C-REACTIVE PROTEIN: CPT

## 2020-07-14 PROCEDURE — 80048 BASIC METABOLIC PNL TOTAL CA: CPT

## 2020-07-14 PROCEDURE — 82306 VITAMIN D 25 HYDROXY: CPT

## 2020-07-14 PROCEDURE — 85025 COMPLETE CBC W/AUTO DIFF WBC: CPT

## 2020-07-14 PROCEDURE — 93005 ELECTROCARDIOGRAM TRACING: CPT

## 2020-07-14 PROCEDURE — 85610 PROTHROMBIN TIME: CPT

## 2020-07-14 PROCEDURE — 36415 COLL VENOUS BLD VENIPUNCTURE: CPT

## 2020-07-14 PROCEDURE — 83036 HEMOGLOBIN GLYCOSYLATED A1C: CPT

## 2020-07-14 PROCEDURE — 71046 X-RAY EXAM CHEST 2 VIEWS: CPT

## 2020-07-14 PROCEDURE — 82040 ASSAY OF SERUM ALBUMIN: CPT

## 2020-07-14 PROCEDURE — 85730 THROMBOPLASTIN TIME PARTIAL: CPT

## 2020-07-14 PROCEDURE — 87641 MR-STAPH DNA AMP PROBE: CPT

## 2020-07-14 NOTE — RAD
INDICATION: Reason: PRE OP, LEFT HIP ARTHROPLASTY SCHEDULED 07/28 / Spl. 

Instructions:  / History: 

 

COMPARISON: June 2018.

 

FINDINGS:

 

2 view of chest obtained.

No focal airspace consolidation or pulmonary edema. Cardiac silhouette is 

unremarkable. Degenerative changes of the spine with osteophyte formation.

 

 

IMPRESSION:

 

*  No focal airspace consolidation or edema.

 

Electronically signed by: Williams Escalante MD (7/14/2020 3:58 PM) 

[id number]

## 2020-07-14 NOTE — EKG
[facility]

              [address] [place] [address]

Test Date:    2020               Test Time:    14:14:01

Pat Name:     SADAF GUZMAN        Department:   

Patient ID:   [id number]           Room:          

Gender:       M                        Technician:   

:          [id number]               Requested By: DAE LEDESMA

Order Number: [id number]           Reading MD:   Prem Roe

                                 Measurements

Intervals                              Axis          

Rate:         60                       P:            17

DE:           168                      QRS:          10

QRSD:         102                      T:            34

QT:           382                                    

QTc:          386                                    

                           Interpretive Statements

SINUS RHYTHM

NON SPECIFIC ST-T WAVE CHANGES

Electronically Signed On 7- 16:18:58 CDT by Prem Roe

## 2020-07-15 LAB — HBA1C MFR BLD: 5.8 % (ref 4.8–5.6)

## 2020-07-24 ENCOUNTER — HOSPITAL ENCOUNTER (OUTPATIENT)
Dept: HOSPITAL 61 - LAB | Age: 65
Discharge: HOME | End: 2020-07-24
Attending: ORTHOPAEDIC SURGERY
Payer: MEDICARE

## 2020-07-24 DIAGNOSIS — Z11.59: Primary | ICD-10-CM

## 2020-07-28 ENCOUNTER — HOSPITAL ENCOUNTER (INPATIENT)
Dept: HOSPITAL 61 - OPSVCIP | Age: 65
LOS: 2 days | Discharge: HOME | DRG: 468 | End: 2020-07-30
Attending: ORTHOPAEDIC SURGERY | Admitting: ORTHOPAEDIC SURGERY
Payer: MEDICARE

## 2020-07-28 VITALS — DIASTOLIC BLOOD PRESSURE: 70 MMHG | SYSTOLIC BLOOD PRESSURE: 104 MMHG

## 2020-07-28 VITALS — BODY MASS INDEX: 35.08 KG/M2 | WEIGHT: 259 LBS | HEIGHT: 72 IN

## 2020-07-28 VITALS — DIASTOLIC BLOOD PRESSURE: 69 MMHG | SYSTOLIC BLOOD PRESSURE: 104 MMHG

## 2020-07-28 VITALS — DIASTOLIC BLOOD PRESSURE: 60 MMHG | SYSTOLIC BLOOD PRESSURE: 119 MMHG

## 2020-07-28 VITALS — SYSTOLIC BLOOD PRESSURE: 113 MMHG | DIASTOLIC BLOOD PRESSURE: 65 MMHG

## 2020-07-28 VITALS — DIASTOLIC BLOOD PRESSURE: 77 MMHG | SYSTOLIC BLOOD PRESSURE: 126 MMHG

## 2020-07-28 VITALS — SYSTOLIC BLOOD PRESSURE: 122 MMHG | DIASTOLIC BLOOD PRESSURE: 69 MMHG

## 2020-07-28 VITALS — SYSTOLIC BLOOD PRESSURE: 138 MMHG | DIASTOLIC BLOOD PRESSURE: 51 MMHG
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
## 2020-07-28 DIAGNOSIS — Z88.0: ICD-10-CM

## 2020-07-28 DIAGNOSIS — T84.021A: Primary | ICD-10-CM

## 2020-07-28 DIAGNOSIS — E78.5: ICD-10-CM

## 2020-07-28 DIAGNOSIS — I10: ICD-10-CM
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
## 2020-07-28 DIAGNOSIS — Z96.642: ICD-10-CM

## 2020-07-28 LAB
APTT BLD: 30 SEC (ref 24–38)
BF MON %: 95 %
BF PMN %: 5 %
BF RBC COUNT: (no result) /CMM
BF SOURCE: (no result)
BF WBC COUNT: 2125 /CMM
CLARITY SPEC: (no result)
COLOR FLD: (no result)
PROTHROMBIN TIME: 12.5 SEC (ref 11.7–14)

## 2020-07-28 PROCEDURE — 85610 PROTHROMBIN TIME: CPT

## 2020-07-28 PROCEDURE — 72170 X-RAY EXAM OF PELVIS: CPT

## 2020-07-28 PROCEDURE — 0SRE01A REPLACEMENT OF LEFT HIP JOINT, ACETABULAR SURFACE WITH METAL SYNTHETIC SUBSTITUTE, UNCEMENTED, OPEN APPROACH: ICD-10-PCS | Performed by: ORTHOPAEDIC SURGERY

## 2020-07-28 PROCEDURE — 85014 HEMATOCRIT: CPT

## 2020-07-28 PROCEDURE — G0378 HOSPITAL OBSERVATION PER HR: HCPCS

## 2020-07-28 PROCEDURE — 89050 BODY FLUID CELL COUNT: CPT

## 2020-07-28 PROCEDURE — 86850 RBC ANTIBODY SCREEN: CPT

## 2020-07-28 PROCEDURE — 86901 BLOOD TYPING SEROLOGIC RH(D): CPT

## 2020-07-28 PROCEDURE — C1713 ANCHOR/SCREW BN/BN,TIS/BN: HCPCS

## 2020-07-28 PROCEDURE — 87071 CULTURE AEROBIC QUANT OTHER: CPT

## 2020-07-28 PROCEDURE — 73502 X-RAY EXAM HIP UNI 2-3 VIEWS: CPT

## 2020-07-28 PROCEDURE — 85730 THROMBOPLASTIN TIME PARTIAL: CPT

## 2020-07-28 PROCEDURE — 85018 HEMOGLOBIN: CPT

## 2020-07-28 PROCEDURE — 0SPE0JZ REMOVAL OF SYNTHETIC SUBSTITUTE FROM LEFT HIP JOINT, ACETABULAR SURFACE, OPEN APPROACH: ICD-10-PCS | Performed by: ORTHOPAEDIC SURGERY

## 2020-07-28 PROCEDURE — A7015 AEROSOL MASK USED W NEBULIZE: HCPCS

## 2020-07-28 PROCEDURE — 87075 CULTR BACTERIA EXCEPT BLOOD: CPT

## 2020-07-28 PROCEDURE — 36415 COLL VENOUS BLD VENIPUNCTURE: CPT

## 2020-07-28 PROCEDURE — 86900 BLOOD TYPING SEROLOGIC ABO: CPT

## 2020-07-28 RX ADMIN — ONDANSETRON SCH MG: 2 INJECTION INTRAMUSCULAR; INTRAVENOUS at 23:15

## 2020-07-28 RX ADMIN — ONDANSETRON SCH MG: 4 TABLET, ORALLY DISINTEGRATING ORAL at 23:15

## 2020-07-28 RX ADMIN — MELOXICAM SCH MG: 7.5 TABLET ORAL at 12:33

## 2020-07-28 RX ADMIN — BACITRACIN SCH MLS/HR: 5000 INJECTION, POWDER, FOR SOLUTION INTRAMUSCULAR at 16:26

## 2020-07-28 RX ADMIN — ONDANSETRON SCH MG: 2 INJECTION INTRAMUSCULAR; INTRAVENOUS at 18:00

## 2020-07-28 RX ADMIN — KETOROLAC TROMETHAMINE SCH MLS/HR: 30 INJECTION, SOLUTION INTRAMUSCULAR at 19:49

## 2020-07-28 RX ADMIN — ONDANSETRON SCH MG: 4 TABLET, ORALLY DISINTEGRATING ORAL at 18:00

## 2020-07-28 RX ADMIN — SIMVASTATIN SCH MG: 40 TABLET, FILM COATED ORAL at 19:49

## 2020-07-28 NOTE — RAD
EXAM: AP pelvis, AP and lateral views of the left hip

 

DATE: 7/28/2020 12:00 AM

 

INDICATION: Postoperative changes left hip 

 

COMPARISON: 6/16/2020

 

FINDINGS/

IMPRESSION:

1.  Postoperative changes of left total hip arthroplasty, in good 

alignment without definite hardware complication or fracture. Components 

are well seated without periprosthetic lucency. 

2.  Expected postoperative soft tissue changes including soft tissue gas 

and skin staples are seen.

3.  Mild right hip joint degenerative changes are partially profiled. 

 

Electronically signed by: Daryl Red MD (7/28/2020 6:14 PM) MARTA

## 2020-07-28 NOTE — NUR
Arrived to unit by bed from PACU. Drowsy but awakes and falls back to sleep. VS stable. 
Placed O2 at 3l per n/c, was sating at 88%. Left hip dressing d/i with ERNESTO and IAC. Pedal 
pulses + bilaterally and warm. ETHAN's and SCD's on bilaterally. IVF's intact and infusing. 
Side rails up x's 3 with call light in reach. Wife at bedside. Cont. monitor.

## 2020-07-28 NOTE — PDOC4
Operative Note


Operative Note


Date of surgery: 7/28/2020





Preoperative diagnosis: History of left total hip arthroplasty with multiple 

episodes of instability/dislocation





Postoperative diagnosis: Same with appearance of well fixated and aligned total 

hip arthroplasty components





Operative procedure: Revision of acetabular component to dual mobility 

components and resizing modular stem





Surgeon: Tyrone





Assistant: David morris assist





Anesthesia: General





Estimated blood loss: 250 cc





Complications: None





Specimens: Intraoperative joint fluid was sent for cell count aerobic anaerobic 

cultures





Drains: Intra-articular catheter only





Operative indications: Patient is a 64-year-old male very active who had a left 

total hip arthroplasty by Dr. Virgen many years ago and required revision due 

to polyethylene wear.  He was found to have well fixated and positioned 

acetabular component and underwent the revision procedure with initially 

excellent results.  He has since had several episodes at least 5 of subsequent 

dislocations of the left hip.  The first of which appeared to be a legitimate 

pushing the motion too far but subsequently seemed to have episodes that really 

did not result from violation of his total hip precautions as far as I can 

discern.  He has really cut back on his desired activities as a result and we 

discussed at length treatment alternatives including the possibility of a 

constrained hip and the drawbacks of that approach if some traumatic episode 

would occur to dislodge the component.  We talked about the possibility of a 

dual mobility construct to increase the range of motion and as best as available

address his instability episode and maintain his activity level.  We covered the

possible risks of infection continued instability nerve or blood vessel damage 

medical or other anesthetic complications among others premature wear or loose

judy and the detailed rationale for the above.  All his questions were answered 

he wishes to proceed with surgical evaluation and treatment





Operative text: Patient was identified procedure verified patient placed in the 

supine position on the operating table.  After adequate amounts of general 

anesthesia were administered he was placed in the decubitus position left side 

up using the Stulberg hip positioner and the left hip was prepped and draped in 

standard sterile fashion.  After timeout was performed patient procedure 

identified and verified, a curvilinear incision was made over the previous 

posterior hip incision dissection was carried out down to the iliotibial band 

and gluteal fascia which were split in line with their fibers.  A Charnley 

retractor was placed.  When the joint capsule was penetrated a return of normal-

appearing joint fluid was obtained and approximately 7 to 8 cc of the normal-

appearing joint fluid were sent for cell count and aerobic anaerobic cultures.  

The hip range of motion was examined and again components appeared to be well 

fixated and positioned and he appeared to have about 70 degrees of internal 

rotation stability at 90 degrees hip flexion and his revised acetabular 

polyethylene was noted to be well fixated and positioned.  Nevertheless with his

multiple unexplained episodes of instability I proceeded with the planned 

revision and dislocated the hip and remove the existing modular neck and head.  

The acetabular polyethylene was drilled and backed out with a screw.  Acetabular

fixation screws were removed without difficulty and the polyethylene was re-

impacted and used as a guide for the 56 mm cup cutters which were gently 

advanced along the entire periphery removing the well fixated acetabular 

component with minimal bone loss.  Some areas of metallosis in the joint capsule

and acetabulum were removed and reaming carried up to a size 57 which showed 

excellent bleeding bone throughout and a size 58 mm Biomet 4-hole [place] titanium

cementless acetabular component was impacted in proper version and orientation. 

A single screw was placed superiorly 35 mm in length with excellent fixation and

a dual mobility neutral acetabular liner was impacted into place.  Trialing was 

then carried out and found to be most successful in restoration of leg length 

offset and stability with a long straight cobalt chromium modular neck with a 28

mm head compatible with the dual mobility system and therefore after trialing 

the 28 mm +0 ceramic head was press-fit into the dual mobility bearing surface 

28 mm head size 46 mm bearing size vitamin E antioxidant infused.  This was 

placed with a cobalt chromium long straight neck and all Fitzpatrick taper surfaces 

impacted and reduced and found to have excellent stability restoration of leg 

length and offset and allowing full range of motion.  Irrigation carried out 

normal saline solution hip capsule and surrounding areas were injected with 

intra-articular mixture.  Area of capsule was reattached to the transosseously 

with max braid suture and capsule was closed in an interrupted fashion with max 

braid suture reinforced with #1 PDS strata fix.  Subcutaneous closure 

accomplished in a layered fashion with buried Vicryl suture skin closure with 

luciana.  Rebecca dressing was applied.  Patient was returned to recovery room in 

stable condition having tolerated procedure well.  David franks was

present for the procedure and assisted in patient positioning prepping draping 

retraction closure and dressings











DAE LEDESMA MD           Jul 28, 2020 18:12

## 2020-07-29 VITALS — DIASTOLIC BLOOD PRESSURE: 58 MMHG | SYSTOLIC BLOOD PRESSURE: 116 MMHG

## 2020-07-29 VITALS — SYSTOLIC BLOOD PRESSURE: 113 MMHG | DIASTOLIC BLOOD PRESSURE: 65 MMHG

## 2020-07-29 VITALS — DIASTOLIC BLOOD PRESSURE: 67 MMHG | SYSTOLIC BLOOD PRESSURE: 130 MMHG

## 2020-07-29 VITALS — SYSTOLIC BLOOD PRESSURE: 116 MMHG | DIASTOLIC BLOOD PRESSURE: 58 MMHG

## 2020-07-29 LAB
HCT VFR BLD CALC: 40 % (ref 39–53)
HGB BLD-MCNC: 13.3 G/DL (ref 13–17.5)
MCHC RBC AUTO-ENTMCNC: 33 G/DL (ref 31–37)
PROTHROMBIN TIME: 13.6 SEC (ref 11.7–14)

## 2020-07-29 RX ADMIN — SIMVASTATIN SCH MG: 40 TABLET, FILM COATED ORAL at 20:51

## 2020-07-29 RX ADMIN — LISINOPRIL SCH MG: 20 TABLET ORAL at 08:43

## 2020-07-29 RX ADMIN — ONDANSETRON SCH MG: 2 INJECTION INTRAMUSCULAR; INTRAVENOUS at 12:00

## 2020-07-29 RX ADMIN — ONDANSETRON SCH MG: 4 TABLET, ORALLY DISINTEGRATING ORAL at 12:00

## 2020-07-29 RX ADMIN — MULTIPLE VITAMINS W/ MINERALS TAB SCH TAB: TAB at 08:44

## 2020-07-29 RX ADMIN — BACITRACIN SCH MLS/HR: 5000 INJECTION, POWDER, FOR SOLUTION INTRAMUSCULAR at 16:26

## 2020-07-29 RX ADMIN — ACETAMINOPHEN SCH MG: 500 TABLET ORAL at 16:50

## 2020-07-29 RX ADMIN — ACETAMINOPHEN SCH MG: 500 TABLET ORAL at 20:52

## 2020-07-29 RX ADMIN — Medication PRN EACH: at 10:24

## 2020-07-29 RX ADMIN — Medication SCH MG: at 08:44

## 2020-07-29 RX ADMIN — GABAPENTIN SCH MG: 100 CAPSULE ORAL at 06:24

## 2020-07-29 RX ADMIN — ONDANSETRON SCH MG: 2 INJECTION INTRAMUSCULAR; INTRAVENOUS at 06:23

## 2020-07-29 RX ADMIN — GABAPENTIN SCH MG: 100 CAPSULE ORAL at 20:52

## 2020-07-29 RX ADMIN — SENNOSIDES AND DOCUSATE SODIUM SCH TAB: 8.6; 5 TABLET ORAL at 08:42

## 2020-07-29 RX ADMIN — KETOROLAC TROMETHAMINE SCH MLS/HR: 30 INJECTION, SOLUTION INTRAMUSCULAR at 06:23

## 2020-07-29 RX ADMIN — ONDANSETRON SCH MG: 4 TABLET, ORALLY DISINTEGRATING ORAL at 06:24

## 2020-07-29 RX ADMIN — GABAPENTIN SCH MG: 100 CAPSULE ORAL at 14:00

## 2020-07-29 RX ADMIN — ACETAMINOPHEN SCH MG: 500 TABLET ORAL at 08:42

## 2020-07-29 RX ADMIN — Medication SCH MG: at 16:50

## 2020-07-29 NOTE — NUR
Guanaco is doing well. Ernesto dressing removed related to saturation large amount of 
serosanguineous drainage. IAC removed (blue tip intact) cleansed with chlor prep than new 
ERNESTO applied.

## 2020-07-29 NOTE — RAD
PELVIS

 

History: Reason: Postop AP pelvis view from 7/28/2020 inadequate, need to 

visualize both hip / Spl. Instructions:  / History: 

 

Technique: AP view the pelvis.

 

Comparison: July 20, 2020

 

Findings:

Recent left total hip arthroplasty. Expected postoperative finding 

subcutaneous and intra-articular gas. Surgical drain noted. Normal 

alignment. No fracture. Mild right hip DJD.

 

Impression: 

1.  Left total hip arthroplasty. No immediate hardware complications.

 

Electronically signed by: Raymond Iglesias DO (7/29/2020 8:25 PM) MARIAN

## 2020-07-29 NOTE — NUR
Pharmacy Warfarin Dosing Note



S: Pharmacy consulted to assist with anticoagulation therapy started 07/28/20 



O: MEGANSADAF is a 64 year old M with ARNOL 



LABS:

Last INR: 1.1 

Last HGB: 13.3 

Last HCT: 40.0 



Last dose of 7.5 mg given on 07/28/20 at 1948 

Ongoing Drug Interactions: MELOXICAM 





A:INR of 1.1  is below desired range. Target range for this patient is: 1.6 - 2.5



P: Warfarin dose: 5 mg Today at 1600

Bridge Therapy: None  

Next INR due   7/30/20 AM

Pharmacy anticoagulation service will continue to follow.



AMRIK ALLEN [facility], 07/29/20 [id number]

## 2020-07-29 NOTE — PDOC
PROGRESS NOTES


Subjective


Subjective


Problems overnight: Chandan is overall getting around well following his hip 

revision, taking tramadol for pain





Objective


Vital Signs





Vital Signs








  Date Time  Temp Pulse Resp B/P (MAP) Pulse Ox O2 Delivery O2 Flow Rate FiO2


 


7/29/20 18:39 98.9 75 20 116/58 (77) 95 Room Air  





 98.9       


 


7/28/20 21:00       3.0 








Physical Exam


Dressing had soaked through and was changed today, leg lengths appear equal 

distal neurovascular status intact he has excellent range of motion


Labs





Laboratory Tests








Test


 7/28/20


12:15 7/28/20


13:50 7/29/20


06:11


 


Prothrombin Time


 12.5 SEC


(11.7-14.0) 


 13.6 SEC


(11.7-14.0)


 


Prothromb Time International


Ratio 1.0 (0.8-1.1) 


 


 1.1 (0.8-1.1) 





 


Activated Partial


Thromboplast Time 30 SEC (24-38) 


 


 





 


Body Fluid Source  Synovial  


 


Body Fluid Color  Red  


 


Body Fluid Clarity  Turbid  


 


Body Fluid Nucleated Cells


 


 2125 /cmm (Not


Established) 





 


Body Fluid Mononuclear WBCs


(%) 


 95 % 


 





 


Body Fluid Polymorphonuclear


Cells 


 5 % 


 





 


Body Fluid Total RBCs Counted


 


 32087 /cmm


(Not 





 


Hemoglobin


 


 


 13.3 g/dL


(13.0-17.5)


 


Hematocrit


 


 


 40.0 %


(39.0-53.0)


 


Mean Corpuscular Hemoglobin


Concent 


 


 33 g/dL


(31-37)








Laboratory Tests








Test


 7/29/20


06:11


 


Hemoglobin


 13.3 g/dL


(13.0-17.5)


 


Hematocrit


 40.0 %


(39.0-53.0)


 


Mean Corpuscular Hemoglobin


Concent 33 g/dL


(31-37)


 


Prothrombin Time


 13.6 SEC


(11.7-14.0)


 


Prothromb Time International


Ratio 1.1 (0.8-1.1) 














Assessment


Assessment


POD#1 revision left hip arthroplasty





Plan


Plan of Care


Dressing change today, pain controlled and expect discharge tomorrow after 

physical therapy





Justicifation of Admission Dx:


Justifications for Admission:


Justification of Admission Dx:  N/A











DAE LEDESMA MD           Jul 29, 2020 19:04

## 2020-07-30 VITALS — SYSTOLIC BLOOD PRESSURE: 130 MMHG | DIASTOLIC BLOOD PRESSURE: 71 MMHG

## 2020-07-30 LAB
HCT VFR BLD CALC: 37 % (ref 39–53)
HGB BLD-MCNC: 12.6 G/DL (ref 13–17.5)
MCHC RBC AUTO-ENTMCNC: 34 G/DL (ref 31–37)
PROTHROMBIN TIME: 15.5 SEC (ref 11.7–14)

## 2020-07-30 RX ADMIN — Medication SCH MG: at 08:07

## 2020-07-30 RX ADMIN — MELOXICAM SCH MG: 7.5 TABLET ORAL at 08:09

## 2020-07-30 RX ADMIN — LISINOPRIL SCH MG: 20 TABLET ORAL at 08:07

## 2020-07-30 RX ADMIN — SENNOSIDES AND DOCUSATE SODIUM SCH TAB: 8.6; 5 TABLET ORAL at 08:07

## 2020-07-30 RX ADMIN — MULTIPLE VITAMINS W/ MINERALS TAB SCH TAB: TAB at 08:07

## 2020-07-30 RX ADMIN — GABAPENTIN SCH MG: 100 CAPSULE ORAL at 13:19

## 2020-07-30 RX ADMIN — Medication PRN EACH: at 10:51

## 2020-07-30 RX ADMIN — GABAPENTIN SCH MG: 100 CAPSULE ORAL at 05:23

## 2020-07-30 RX ADMIN — ACETAMINOPHEN SCH MG: 500 TABLET ORAL at 14:10

## 2020-07-30 RX ADMIN — ACETAMINOPHEN SCH MG: 500 TABLET ORAL at 08:07

## 2020-07-30 RX ADMIN — ACETAMINOPHEN SCH MG: 500 TABLET ORAL at 03:00

## 2020-07-30 NOTE — DISCH
DISCHARGE INSTRUCTIONS


Condition on Discharge


Condition on Discharge:  Stable





Activity After Discharge


Activity Instructions for Disc:  Activity as tolerated, Progressive ambulation


Bathing Instructions:  Shower-keep dressing dry, No Tub Bath until see 


Lifting Instructions after Dis:  No heavy lifting, No pulling or pushing


Exercise Instruction after Dis:  Walk 30 min, 3 x per week, Progress as 

tolerated


Driving Instructions after Dis:  Do not drive


Weight Bearing Status after Di:  As tolerated





Diet after Discharge


Diet after Discharge:  Regular





Wound Incision Care


Wound/Incision Care:  Ice to area for comfort, Do not change dressing, No wound 

care needed


Other wound/incision instructi:  DO NOT change ERNESTO dressing. It remains in 

place till your appointment





Community/Resources/Services


Services at Discharge:  Outpatient Therapy





Contacting the  after DC


Call your doctor for:  Concerns you may have





Follow-Up


Follow Up With:  Dr Hansen on August 10th at 9:45am [phone]





Treatment/Equipment after DC


Adaptive Equipment Issued:  None





Warfarin Follow-Up


Warfarin Follow UP:  Dosing and testing per [place] pharmacy anticoagulation 

clinic











DAE HANSEN MD           Jul 30, 2020 12:20

## 2020-07-30 NOTE — NUR
Patient left the building around 1415 with his wife. Discharge education completed by this 
nurse, therapy, pharmacy. and the doctor prior to discharge. Coumadin given prior to 
discharge. Patient refused pain medication. ERNESTO dressing CDI with green light flashing. IV 
discontinued prior to this nurses shift. Script given for tramadol. Outpatient therapy set 
up by the . No concerns noted at discharge.

## 2020-07-30 NOTE — NUR
Pharmacy Warfarin Dosing Note



S:Pharmacy consulted to assist with anticoagulation therapy started 07/28/20 with target 
INR: 1.6 - 2.5





O:SADAF GUZMAN is a 64 year old M with 

ARNOL 



LABS:

Last INR: 1.3 

Last HGB: 12.6 

Last HCT: 37 

Last PLT: - 



Last dose of 5 mg given on 07/29/20 at 1948 

Previous Regimen: 

Vitamin K given: 

Drug Interaction Changes: Same Interacting Drug

Ongoing Drug Interactions: MELOXICAM 







A:INR of 1.3  is below desired range. Target range for this patient is: 1.6 - 2.5



P: Warfarin dose: 5 mg Today at 1600

Bridge Therapy: None  

Next INR due Monday at physical therapy.

Pharmacy anticoagulation service will continue to follow.



 DIANA LOPEZ [facility], 07/30/20 [id number]

## 2020-07-31 NOTE — DS
DATE OF DISCHARGE:  07/30/2020



ORTHOPEDIC DISCHARGE SUMMARY



PRINCIPAL DIAGNOSIS:  Left hip joint instability.



PROCEDURE:  Revision of left hip to a dual mobility acetabular construct.



DISPOSITION:  Home with outpatient physical therapy.



DISCHARGE MEDICATIONS:  Include tramadol 50 mg p.o. every 4 hours p.r.n. pain,

Coumadin as directed by Anticoagulation Clinic, resume preoperative medications.



FOLLOWUP:  Dr. Hansen in 2 weeks postoperatively.



ACTIVITY:  Weightbearing as tolerated; no hip precautions necessary.



BRIEF DESCRIPTION OF HOSPITAL COURSE:  The patient underwent an uneventful

conversion of his left hip to an acetabular revision with a dual mobility

construct for stability.  Postoperatively, he was weightbearing as tolerated and

medically stable.  He progressed through his physical therapy well and was

discharged home in stable condition again with outpatient physical therapy;

disposition planned.

 



______________________________

DAE HANSEN MD



DR:  MARTINA/paul  JOB#:  [id number] / [id number]

DD:  07/31/2020 15:02  DT:  07/31/2020 15:27